# Patient Record
Sex: FEMALE | Race: WHITE | NOT HISPANIC OR LATINO | Employment: STUDENT | URBAN - METROPOLITAN AREA
[De-identification: names, ages, dates, MRNs, and addresses within clinical notes are randomized per-mention and may not be internally consistent; named-entity substitution may affect disease eponyms.]

---
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
## 2020-07-10 ENCOUNTER — TELEPHONE (OUTPATIENT)
Dept: FAMILY MEDICINE CLINIC | Facility: CLINIC | Age: 20
End: 2020-07-10

## 2020-07-10 NOTE — TELEPHONE ENCOUNTER
----- Message from David Eng sent at 7/6/2020  3:50 PM EDT -----      ----- Message -----  From: Sergio Amezquita  Sent: 6/18/2020   9:56 AM EDT  To: David Eng    Last visit June 2018, please contact for overdue visit

## 2020-07-10 NOTE — TELEPHONE ENCOUNTER
Called and spoke with Patient's mom Viridiana, who advised they have moved out of state  Removed Dr Obdulio Briones as PCP